# Patient Record
Sex: FEMALE | Race: WHITE | HISPANIC OR LATINO | Employment: FULL TIME | URBAN - METROPOLITAN AREA
[De-identification: names, ages, dates, MRNs, and addresses within clinical notes are randomized per-mention and may not be internally consistent; named-entity substitution may affect disease eponyms.]

---

## 2020-05-27 ENCOUNTER — HOSPITAL ENCOUNTER (OUTPATIENT)
Dept: URGENT CARE | Facility: CLINIC | Age: 85
Discharge: HOME | End: 2020-05-27
Attending: EMERGENCY MEDICINE

## 2022-05-07 PROCEDURE — 87086 URINE CULTURE/COLONY COUNT: CPT | Performed by: NURSE PRACTITIONER

## 2022-05-09 ENCOUNTER — TELEPHONE (OUTPATIENT)
Dept: URGENT CARE | Facility: CLINIC | Age: 87
End: 2022-05-09

## 2025-05-02 ENCOUNTER — APPOINTMENT (OUTPATIENT)
Dept: CT IMAGING | Facility: HOSPITAL | Age: OVER 89
End: 2025-05-02
Payer: MEDICARE

## 2025-05-02 ENCOUNTER — APPOINTMENT (OUTPATIENT)
Dept: GENERAL RADIOLOGY | Facility: HOSPITAL | Age: OVER 89
End: 2025-05-02
Payer: MEDICARE

## 2025-05-02 ENCOUNTER — HOSPITAL ENCOUNTER (OUTPATIENT)
Facility: HOSPITAL | Age: OVER 89
Setting detail: OBSERVATION
Discharge: HOME | End: 2025-05-03
Attending: EMERGENCY MEDICINE | Admitting: INTERNAL MEDICINE
Payer: MEDICARE

## 2025-05-02 DIAGNOSIS — I60.9 SAH (SUBARACHNOID HEMORRHAGE): ICD-10-CM
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
## 2025-05-02 DIAGNOSIS — R26.2 DIFFICULTY IN WALKING: ICD-10-CM

## 2025-05-02 DIAGNOSIS — S01.01XA LACERATION OF SCALP, INITIAL ENCOUNTER: ICD-10-CM

## 2025-05-02 DIAGNOSIS — R13.10 DYSPHAGIA, UNSPECIFIED TYPE: ICD-10-CM

## 2025-05-02 DIAGNOSIS — S06.6XAA SUBARACHNOID HEMATOMA, WITH UNKNOWN LOSS OF CONSCIOUSNESS STATUS, INITIAL ENCOUNTER: ICD-10-CM

## 2025-05-02 LAB
ALBUMIN SERPL-MCNC: 3.8 G/DL (ref 3.5–5.2)
ALBUMIN/GLOB SERPL: 1.4 G/DL
ALP SERPL-CCNC: 57 U/L (ref 39–117)
ALT SERPL W P-5'-P-CCNC: 10 U/L (ref 1–33)
ANION GAP SERPL CALCULATED.3IONS-SCNC: 10.2 MMOL/L (ref 5–15)
AST SERPL-CCNC: 15 U/L (ref 1–32)
BASOPHILS # BLD AUTO: 0.04 10*3/MM3 (ref 0–0.2)
BASOPHILS NFR BLD AUTO: 0.3 % (ref 0–1.5)
BILIRUB SERPL-MCNC: 0.3 MG/DL (ref 0–1.2)
BUN SERPL-MCNC: 23 MG/DL (ref 8–23)
BUN/CREAT SERPL: 19.2 (ref 7–25)
CALCIUM SPEC-SCNC: 9.1 MG/DL (ref 8.2–9.6)
CHLORIDE SERPL-SCNC: 103 MMOL/L (ref 98–107)
CO2 SERPL-SCNC: 24.8 MMOL/L (ref 22–29)
CREAT SERPL-MCNC: 1.2 MG/DL (ref 0.57–1)
DEPRECATED RDW RBC AUTO: 45.5 FL (ref 37–54)
EGFRCR SERPLBLD CKD-EPI 2021: 43.1 ML/MIN/1.73
EOSINOPHIL # BLD AUTO: 0.04 10*3/MM3 (ref 0–0.4)
EOSINOPHIL NFR BLD AUTO: 0.3 % (ref 0.3–6.2)
ERYTHROCYTE [DISTWIDTH] IN BLOOD BY AUTOMATED COUNT: 13.4 % (ref 12.3–15.4)
GLOBULIN UR ELPH-MCNC: 2.8 GM/DL
GLUCOSE BLDC GLUCOMTR-MCNC: 105 MG/DL (ref 70–99)
GLUCOSE BLDC GLUCOMTR-MCNC: 119 MG/DL (ref 70–99)
GLUCOSE SERPL-MCNC: 145 MG/DL (ref 65–99)
HCT VFR BLD AUTO: 42.7 % (ref 34–46.6)
HGB BLD-MCNC: 13.9 G/DL (ref 12–15.9)
IMM GRANULOCYTES # BLD AUTO: 0.08 10*3/MM3 (ref 0–0.05)
IMM GRANULOCYTES NFR BLD AUTO: 0.6 % (ref 0–0.5)
LYMPHOCYTES # BLD AUTO: 0.74 10*3/MM3 (ref 0.7–3.1)
LYMPHOCYTES NFR BLD AUTO: 5.9 % (ref 19.6–45.3)
MCH RBC QN AUTO: 30.2 PG (ref 26.6–33)
MCHC RBC AUTO-ENTMCNC: 32.6 G/DL (ref 31.5–35.7)
MCV RBC AUTO: 92.6 FL (ref 79–97)
MONOCYTES # BLD AUTO: 0.58 10*3/MM3 (ref 0.1–0.9)
MONOCYTES NFR BLD AUTO: 4.6 % (ref 5–12)
NEUTROPHILS NFR BLD AUTO: 11.03 10*3/MM3 (ref 1.7–7)
NEUTROPHILS NFR BLD AUTO: 88.3 % (ref 42.7–76)
NRBC BLD AUTO-RTO: 0 /100 WBC (ref 0–0.2)
PLATELET # BLD AUTO: 284 10*3/MM3 (ref 140–450)
PMV BLD AUTO: 10.9 FL (ref 6–12)
POTASSIUM SERPL-SCNC: 3.9 MMOL/L (ref 3.5–5.2)
PROT SERPL-MCNC: 6.6 G/DL (ref 6–8.5)
RBC # BLD AUTO: 4.61 10*6/MM3 (ref 3.77–5.28)
SODIUM SERPL-SCNC: 138 MMOL/L (ref 136–145)
WBC NRBC COR # BLD AUTO: 12.51 10*3/MM3 (ref 3.4–10.8)

## 2025-05-02 PROCEDURE — 73521 X-RAY EXAM HIPS BI 2 VIEWS: CPT

## 2025-05-02 PROCEDURE — 70450 CT HEAD/BRAIN W/O DYE: CPT

## 2025-05-02 PROCEDURE — G0378 HOSPITAL OBSERVATION PER HR: HCPCS

## 2025-05-02 PROCEDURE — 82948 REAGENT STRIP/BLOOD GLUCOSE: CPT

## 2025-05-02 PROCEDURE — 25010000002 CLEVIDIPINE BUTYRATE PER 1 MG: Performed by: INTERNAL MEDICINE

## 2025-05-02 PROCEDURE — 80053 COMPREHEN METABOLIC PANEL: CPT | Performed by: EMERGENCY MEDICINE

## 2025-05-02 PROCEDURE — 96366 THER/PROPH/DIAG IV INF ADDON: CPT

## 2025-05-02 PROCEDURE — 25010000002 LIDOCAINE 1% - EPINEPHRINE 1:100000 1 %-1:100000 SOLUTION: Performed by: EMERGENCY MEDICINE

## 2025-05-02 PROCEDURE — 99222 1ST HOSP IP/OBS MODERATE 55: CPT | Performed by: INTERNAL MEDICINE

## 2025-05-02 PROCEDURE — 99291 CRITICAL CARE FIRST HOUR: CPT | Performed by: INTERNAL MEDICINE

## 2025-05-02 PROCEDURE — 96367 TX/PROPH/DG ADDL SEQ IV INF: CPT

## 2025-05-02 PROCEDURE — C9248 INJ, CLEVIDIPINE BUTYRATE: HCPCS | Performed by: INTERNAL MEDICINE

## 2025-05-02 PROCEDURE — 36415 COLL VENOUS BLD VENIPUNCTURE: CPT

## 2025-05-02 PROCEDURE — 96365 THER/PROPH/DIAG IV INF INIT: CPT

## 2025-05-02 PROCEDURE — 72125 CT NECK SPINE W/O DYE: CPT

## 2025-05-02 PROCEDURE — 99285 EMERGENCY DEPT VISIT HI MDM: CPT

## 2025-05-02 PROCEDURE — 25010000002 NICARDIPINE IN NACL 20-0.86 MG/200ML-% SOLUTION: Performed by: INTERNAL MEDICINE

## 2025-05-02 PROCEDURE — 85025 COMPLETE CBC W/AUTO DIFF WBC: CPT | Performed by: EMERGENCY MEDICINE

## 2025-05-02 RX ORDER — ASPIRIN 81 MG/1
81 TABLET ORAL EVERY EVENING
COMMUNITY
End: 2025-05-03 | Stop reason: HOSPADM

## 2025-05-02 RX ORDER — AMLODIPINE BESYLATE 5 MG/1
5 TABLET ORAL DAILY
COMMUNITY
End: 2025-05-03 | Stop reason: HOSPADM

## 2025-05-02 RX ORDER — ERGOCALCIFEROL 1.25 MG/1
50000 CAPSULE, LIQUID FILLED ORAL 2 TIMES WEEKLY
COMMUNITY

## 2025-05-02 RX ORDER — SODIUM CHLORIDE 9 MG/ML
40 INJECTION, SOLUTION INTRAVENOUS AS NEEDED
Status: DISCONTINUED | OUTPATIENT
Start: 2025-05-02 | End: 2025-05-03 | Stop reason: HOSPADM

## 2025-05-02 RX ORDER — SODIUM CHLORIDE 0.9 % (FLUSH) 0.9 %
10 SYRINGE (ML) INJECTION AS NEEDED
Status: DISCONTINUED | OUTPATIENT
Start: 2025-05-02 | End: 2025-05-03 | Stop reason: HOSPADM

## 2025-05-02 RX ORDER — LIDOCAINE HYDROCHLORIDE AND EPINEPHRINE 10; 10 MG/ML; UG/ML
10 INJECTION, SOLUTION INFILTRATION; PERINEURAL ONCE
Status: COMPLETED | OUTPATIENT
Start: 2025-05-02 | End: 2025-05-02

## 2025-05-02 RX ORDER — SODIUM CHLORIDE 0.9 % (FLUSH) 0.9 %
10 SYRINGE (ML) INJECTION EVERY 12 HOURS SCHEDULED
Status: DISCONTINUED | OUTPATIENT
Start: 2025-05-02 | End: 2025-05-03 | Stop reason: HOSPADM

## 2025-05-02 RX ADMIN — LIDOCAINE HYDROCHLORIDE,EPINEPHRINE BITARTRATE 10 ML: 10; .01 INJECTION, SOLUTION INFILTRATION; PERINEURAL at 11:30

## 2025-05-02 RX ADMIN — Medication 10 ML: at 20:50

## 2025-05-02 RX ADMIN — CLEVIPIDINE 2 MG/HR: 0.5 EMULSION INTRAVENOUS at 16:30

## 2025-05-02 RX ADMIN — NICARDIPINE HYDROCHLORIDE 5 MG/HR: 0.1 INJECTION, SOLUTION INTRAVENOUS at 15:45

## 2025-05-02 NOTE — ED PROVIDER NOTES
Time: 11:13 AM EDT  Date of encounter:  5/2/2025  Independent Historian/Clinical History and Information was obtained by:   Patient and Family    History is limited by: N/A    Chief Complaint: Fall      History of Present Illness:  Patient is a 89 y.o. year old female who presents to the emergency department for evaluation of injuries status post fall.  Patient reports she tripped on a floor grate and fell hitting her head.  Unknown loss of consciousness.      Patient Care Team  Primary Care Provider: Provider, Yohana Known    Past Medical History:     Allergies   Allergen Reactions    Penicillins Hives     Past Medical History:   Diagnosis Date    Hypertension      History reviewed. No pertinent surgical history.  History reviewed. No pertinent family history.    Home Medications:  Prior to Admission medications    Medication Sig Start Date End Date Taking? Authorizing Provider   amLODIPine (NORVASC) 5 MG tablet Take 1 tablet by mouth Daily.    Lorraine Bauman MD   aspirin 81 MG EC tablet Take 1 tablet by mouth Daily.    Lorraine Bauman MD   vitamin D (ERGOCALCIFEROL) 1.25 MG (63443 UT) capsule capsule Take 1 capsule by mouth 1 (One) Time Per Week.    Lorraine Bauman MD        Social History:   Social History     Tobacco Use    Smoking status: Never    Smokeless tobacco: Never   Vaping Use    Vaping status: Never Used   Substance Use Topics    Alcohol use: Never    Drug use: Never         Review of Systems:  Review of Systems   Constitutional:  Negative for chills and fever.   HENT:  Negative for congestion, rhinorrhea and sore throat.    Eyes:  Negative for pain and visual disturbance.   Respiratory:  Negative for apnea, cough, chest tightness and shortness of breath.    Cardiovascular:  Negative for chest pain and palpitations.   Gastrointestinal:  Negative for abdominal pain, diarrhea, nausea and vomiting.   Genitourinary:  Negative for difficulty urinating and dysuria.   Musculoskeletal:  Negative  "for joint swelling and myalgias.   Skin:  Negative for color change.   Neurological:  Negative for seizures and headaches.   Psychiatric/Behavioral: Negative.     All other systems reviewed and are negative.       Physical Exam:  /67   Pulse 68   Temp 97.8 °F (36.6 °C)   Resp 18   Ht 160 cm (63\")   Wt 74.4 kg (164 lb 0.4 oz)   SpO2 96%   BMI 29.06 kg/m²     Physical Exam  Vitals and nursing note reviewed.   Constitutional:       General: She is not in acute distress.     Appearance: Normal appearance. She is not toxic-appearing.   HENT:      Head: Normocephalic.      Jaw: There is normal jaw occlusion.   Eyes:      General: Lids are normal.      Extraocular Movements: Extraocular movements intact.      Conjunctiva/sclera: Conjunctivae normal.      Pupils: Pupils are equal, round, and reactive to light.   Cardiovascular:      Rate and Rhythm: Normal rate and regular rhythm.      Pulses: Normal pulses.      Heart sounds: Normal heart sounds.   Pulmonary:      Effort: Pulmonary effort is normal. No respiratory distress.      Breath sounds: Normal breath sounds. No wheezing or rhonchi.   Abdominal:      General: Abdomen is flat.      Palpations: Abdomen is soft.      Tenderness: There is no abdominal tenderness. There is no guarding or rebound.   Musculoskeletal:         General: Normal range of motion.      Cervical back: Normal range of motion and neck supple.      Right lower leg: No edema.      Left lower leg: No edema.      Comments: Mild discomfort bilateral hips but full range of motion   Skin:     General: Skin is warm.      Comments: Scalp laceration left posterior parietal   Neurological:      Mental Status: She is alert and oriented to person, place, and time. Mental status is at baseline.   Psychiatric:         Mood and Affect: Mood normal.                    Medical Decision Making:      Comorbidities that affect care:    Hypertension    External Notes reviewed:    Previous Clinic Note: Urgent " care visit for UTI management      The following orders were placed and all results were independently analyzed by me:  Orders Placed This Encounter   Procedures    Laceration Repair    Laceration Repair    CT Head Without Contrast    CT Cervical Spine Without Contrast    XR Hips Bilateral With or Without Pelvis 2 View    Comprehensive Metabolic Panel    CBC Auto Differential    Neurosurgery (on-call MD unless specified)    Inpatient Hospitalist Consult    CBC & Differential       Medications Given in the Emergency Department:  Medications   lidocaine 1% - EPINEPHrine 1:846202 (XYLOCAINE W/EPI) 1 %-1:384942 injection 10 mL (has no administration in time range)        ED Course:         Labs:    Lab Results (last 24 hours)       Procedure Component Value Units Date/Time    CBC & Differential [[id number]]  (Abnormal) Collected: 05/02/25 1140    Specimen: Blood Updated: 05/02/25 1144    Narrative:      The following orders were created for panel order CBC & Differential.  Procedure                               Abnormality         Status                     ---------                               -----------         ------                     CBC Auto Differential[[id number]]        Abnormal            Final result                 Please view results for these tests on the individual orders.    Comprehensive Metabolic Panel [[id number]]  (Abnormal) Collected: 05/02/25 1140    Specimen: Blood Updated: 05/02/25 1210     Glucose 145 mg/dL      BUN 23 mg/dL      Creatinine 1.20 mg/dL      Sodium 138 mmol/L      Potassium 3.9 mmol/L      Chloride 103 mmol/L      CO2 24.8 mmol/L      Calcium 9.1 mg/dL      Total Protein 6.6 g/dL      Albumin 3.8 g/dL      ALT (SGPT) 10 U/L      AST (SGOT) 15 U/L      Alkaline Phosphatase 57 U/L      Total Bilirubin 0.3 mg/dL      Globulin 2.8 gm/dL      A/G Ratio 1.4 g/dL      BUN/Creatinine Ratio 19.2     Anion Gap 10.2 mmol/L      eGFR 43.1 mL/min/1.73     Narrative:      GFR Categories in  Chronic Kidney Disease (CKD)              GFR Category          GFR (mL/min/1.73)    Interpretation  G1                    90 or greater        Normal or high (1)  G2                    60-89                Mild decrease (1)  G3a                   45-59                Mild to moderate decrease  G3b                   30-44                Moderate to severe decrease  G4                    15-29                Severe decrease  G5                    14 or less           Kidney failure    (1)In the absence of evidence of kidney disease, neither GFR category G1 or G2 fulfill the criteria for CKD.    eGFR calculation 2021 CKD-EPI creatinine equation, which does not include race as a factor    CBC Auto Differential [[id number]]  (Abnormal) Collected: 05/02/25 1140    Specimen: Blood Updated: 05/02/25 1144     WBC 12.51 10*3/mm3      RBC 4.61 10*6/mm3      Hemoglobin 13.9 g/dL      Hematocrit 42.7 %      MCV 92.6 fL      MCH 30.2 pg      MCHC 32.6 g/dL      RDW 13.4 %      RDW-SD 45.5 fl      MPV 10.9 fL      Platelets 284 10*3/mm3      Neutrophil % 88.3 %      Lymphocyte % 5.9 %      Monocyte % 4.6 %      Eosinophil % 0.3 %      Basophil % 0.3 %      Immature Grans % 0.6 %      Neutrophils, Absolute 11.03 10*3/mm3      Lymphocytes, Absolute 0.74 10*3/mm3      Monocytes, Absolute 0.58 10*3/mm3      Eosinophils, Absolute 0.04 10*3/mm3      Basophils, Absolute 0.04 10*3/mm3      Immature Grans, Absolute 0.08 10*3/mm3      nRBC 0.0 /100 WBC              Imaging:    XR Hips Bilateral With or Without Pelvis 2 View  Result Date: 5/2/2025  XR HIPS BILATERAL W OR WO PELVIS 2 VIEW Date of Exam: 5/2/2025 11:20 AM EDT Indication: fall, pain Comparison: None available. Findings: Subtle findings are limited by diffuse osteopenia. The bony pelvis appears to be grossly intact without evidence of fracture or diastases. Evaluation of the sacrum and coccyx additionally limited by bowel gas and fecal material. Degenerative changes are noted at  the lumbar sacral spine most prominently to the right at L4-L5. Bilateral hips demonstrate no displaced fracture or dislocation.     Impression: No displaced fracture or dislocation within the limitations of the study. If there is high clinical suspicion and additional imaging can always be obtained Electronically Signed: Oliver To MD  5/2/2025 11:45 AM EDT  Workstation ID: [id number]    CT Cervical Spine Without Contrast  Result Date: 5/2/2025  CT CERVICAL SPINE WO CONTRAST Date of Exam: 5/2/2025 10:32 AM EDT Indication: Fall with head injury. Comparison: None available. Technique: Axial CT images were obtained of the cervical spine without contrast administration.  Reconstructed coronal and sagittal images were also obtained. Automated exposure control and iterative construction methods were used. Findings: OSSEOUS STRUCTURES: No fracture noted. Along the left transverse process of the T1 vertebral body there is lucency and mild osseous expansion with underlying osseous lesion not excluded. ALIGNMENT: There is grossly normal alignment of cervical vertebral bodies. DISC SPACE: Intervertebral disc base narrowing is noted at levels C5-C7. JOINTS: There is multilevel facet hypertrophy. Degenerative changes are noted at the right temporomandibular joint with flattening of the mandibular condyle. SOFT TISSUES: There is a right thyroid nodule measuring 7 mm without follow-up necessary. LUNG APICES: Unremarkable LEVELS: C2/3: Broad-based disc osteophyte complex with uncovertebral joint spurring and facet changes without significant central canal or neural foramina stenosis. C3/4: Broad-based disc osteophyte complex with facet hypertrophy with uncovertebral joint spurring with mild to moderate bilateral neural foramina stenosis. C4/5: Broad-based disc osteophyte complex with bilateral facet hypertrophy. There is moderate to severe bilateral neural foramina stenosis. C5/6: Broad-based disc osteophyte complex with  bilateral facet hypertrophy with severe left neural foramina stenosis. C6/7: Broad-based disc osteophyte complex with facet changes and uncovertebral joint spurring with moderate left neural foramina stenosis.     Impression: 1. Multilevel degenerative changes in the cervical spine without acute osseous injury. 2. Lucency within the left T1 transverse process with mild osseous expansion for which underlying osseous lesion and/or metastatic disease cannot be excluded. Electronically Signed: Shelby Conway MD  5/2/2025 11:03 AM EDT  Workstation ID: [id number]    CT Head Without Contrast  Result Date: 5/2/2025  CT HEAD WO CONTRAST Date of Exam: 5/2/2025 10:32 AM EDT Indication: Fall with head injury. Comparison: None available. Technique: Axial CT images were obtained of the head and cervical spine without contrast administration.  Reconstructed coronal and sagittal images were also obtained. Automated exposure control and iterative construction methods were used. Findings: CT HEAD: There is no evidence of acute infarction. There is a small area of subarachnoid hemorrhage involving a high posterior right frontoparietal lobe sulcus (images 37-40, series 3). No definitive additional hemorrhage identified. There are no extra-axial collections. Ventricles and CSF spaces are symmetric. No mass effect nor hydrocephalus. Brain parenchyma appears normal for age.  Paranasal sinuses and mastoid air cells are adequately aerated.  Osseous structures and orbits appear intact. There is a left parietal scalp laceration. No radiopaque foreign body identified. CT CERVICAL SPINE: OSSEOUS STRUCTURES: No fracture nor bony destructive process. There are large flowing osteophytes along the left anterior margin of the vertebral bodies spanning C5-C7. ALIGNMENT: There is 2 mm of anterior listhesis of C4 on C5 and C5 on C6. Alignment is otherwise normal. DISC SPACE: There is moderate to advanced lower cervical spondylosis most pronounced at  C6/7. JOINTS: There is advanced facet arthropathy most pronounced in the mid cervical spine. SOFT TISSUES: There is calcified carotid atherosclerotic disease. LUNG APICES: Unremarkable LEVELS: No significant osseous central canal nor foraminal stenosis identified at any level.     Impression: 1.Small area of subarachnoid hemorrhage involving a high posterior right frontoparietal lobe sulcus. 2.Left parietal scalp laceration. No radiopaque foreign body identified. 3.No cervical spine fracture. 4.Cervical degenerative changes as described above. Critical findings discussed by Dr. Sukumar Conway with Ibrahima Rao MD at 10:56 a.m. on 5/2/2025 Electronically Signed: Sukumar Conway MD  5/2/2025 10:58 AM EDT  Workstation ID: [id number]        Differential Diagnosis and Discussion:    Trauma:  Differential diagnosis considered but not limited to were subarachnoid hemorrhage, intracranial bleeding, pneumothorax, cardiac contusion, lung contusion, intra-abdominal bleeding, and compartment syndrome of any extremity or other significant traumatic pathology    PROCEDURES:    Labs were collected in the emergency department and all labs were reviewed and interpreted by me.  X-ray were performed in the emergency department and all X-ray impressions were independently interpreted by me.  CT scan was performed in the emergency department and the CT scan radiology impression was interpreted by me.    No orders to display       Laceration Repair    Date/Time: 5/2/2025 12:34 PM    Performed by: Lani Gonzalez APRN  Authorized by: Shreyas Warren MD    Consent:     Consent obtained:  Verbal    Consent given by:  Patient    Risks, benefits, and alternatives were discussed: yes      Risks discussed:  Infection, pain, retained foreign body, tendon damage, poor cosmetic result, need for additional repair, nerve damage, poor wound healing and vascular damage    Alternatives discussed:  No treatment  Anesthesia:     Anesthesia method:  Local  infiltration    Local anesthetic:  Lidocaine 1% WITH epi  Laceration details:     Location:  Scalp    Scalp location:  L temporal    Length (cm):  3  Exploration:     Limited defect created (wound extended): no      Hemostasis achieved with:  Epinephrine    Imaging outcome: foreign body not noted      Wound exploration: entire depth of wound visualized      Contaminated: no    Treatment:     Area cleansed with:  Chlorhexidine and saline    Amount of cleaning:  Standard    Irrigation solution:  Sterile saline    Irrigation volume:  50    Irrigation method:  Syringe    Visualized foreign bodies/material removed: no      Debridement:  None    Undermining:  None    Scar revision: no    Skin repair:     Repair method:  Staples    Number of staples:  4  Approximation:     Approximation:  Close  Repair type:     Repair type:  Simple  Post-procedure details:     Dressing:  Open (no dressing)    Procedure completion:  Tolerated well, no immediate complications  Laceration Repair    Date/Time: 5/2/2025 12:39 PM    Performed by: Lani Gonzalez APRN  Authorized by: Shreyas Warren MD    Consent:     Consent obtained:  Verbal    Consent given by:  Patient    Risks, benefits, and alternatives were discussed: yes      Risks discussed:  Infection, pain, tendon damage, retained foreign body, vascular damage, poor wound healing, poor cosmetic result, need for additional repair and nerve damage    Alternatives discussed:  No treatment  Anesthesia:     Anesthesia method:  Local infiltration    Local anesthetic:  Lidocaine 1% WITH epi  Laceration details:     Location:  Scalp    Scalp location:  L temporal    Length (cm):  2  Pre-procedure details:     Preparation:  Patient was prepped and draped in usual sterile fashion and imaging obtained to evaluate for foreign bodies  Exploration:     Limited defect created (wound extended): no      Hemostasis achieved with:  Epinephrine    Imaging outcome: foreign body not noted      Wound  exploration: entire depth of wound visualized      Wound extent: no areolar tissue violation noted, no fascia violation noted, no foreign bodies/material noted, no muscle damage noted, no nerve damage noted, no tendon damage noted, no underlying fracture noted and no vascular damage noted      Contaminated: no    Treatment:     Area cleansed with:  Chlorhexidine and saline    Amount of cleaning:  Standard    Irrigation solution:  Sterile saline    Irrigation volume:  50    Irrigation method:  Syringe    Visualized foreign bodies/material removed: no      Debridement:  None    Undermining:  None    Scar revision: no    Skin repair:     Repair method:  Staples    Number of staples:  2  Approximation:     Approximation:  Close  Repair type:     Repair type:  Simple  Post-procedure details:     Dressing:  Open (no dressing)    Procedure completion:  Tolerated well, no immediate complications      MDM  Number of Diagnoses or Management Options  Fall, initial encounter  Laceration of scalp, initial encounter  Subarachnoid hematoma, with unknown loss of consciousness status, initial encounter  Diagnosis management comments: In summary this is a 89-year-old female who presents to the Emergency Department for evaluation after trip and fall at home.  CT brain reviewed by me does show small subdural hemorrhage for which neurosurgery was consulted and recommends repeat CT scan in the morning to ensure stability.  CBC independently reviewed and interpreted by me and shows no critical abnormalities.  CMP independently reviewed and interpreted by me and shows no critical abnormalities.  X-ray of bilateral hips is unremarkable negative for acute traumatic injuries.  Patient case has been discussed with the hospitalist team who will admit to the hospital for further evaluation and continuation of treatment.             Total Critical Care time of 35 minutes. Total critical care time documented does not include time spent on  separately billed procedures for services of nurses or physician assistants including emergently consulting neurosurgery, monitoring blood pressure with a low threshold to start hypertensive medications.. I personally saw and examined the patient. I have reviewed all diagnostic interpretations and treatment plans as written. I was present for the key portions of any procedures performed and the inclusive time noted in any critical care statement. Critical care time includes patient management by me, time spent at the patients bedside,  time to review lab and imaging results, discussing patient care, documentation in the medical record, and time spent with family or caregiver.          Patient Care Considerations:    CHEST X-RAY: I considered ordering a chest x-ray however no respiratory complaints      Consultants/Shared Management Plan:    Hospitalist: I have discussed the case with Dr. Gilbert who agrees to accept the patient for admission.  Consultant: I have discussed the case with Dr. López who agrees to consult on the patient.    Social Determinants of Health:    Patient has presented with family members who are responsible, reliable and will ensure follow up care.      Disposition and Care Coordination:    Admit:   Through independent evaluation of the patient's history, physical, and imperical data, the patient meets criteria for inpatient admission to the hospital.        Final diagnoses:   Fall, initial encounter   Subarachnoid hematoma, with unknown loss of consciousness status, initial encounter   Laceration of scalp, initial encounter        ED Disposition       ED Disposition   Decision to Admit    Condition   --    Comment   --               This medical record created using voice recognition software.             Shreyas Warren MD  05/02/25 [id number]

## 2025-05-02 NOTE — H&P
[facility]  Date: 2025   Patient Name: Lindsey Choi  : 1934  MRN: [id number]  Primary Care Physician:  Provider, No Known  Date of admission: 2025    Subjective   Subjective     Chief Complaint: Fall    HPI:    Lindsey Choi is a 89 y.o. female past medical history of hypertension who presents emergency department after a fall    Patient reported that she had a fall from standing today.  Notes that she had a cut on her head.  As result scan the ER for further evaluation.    In the emergency department patient's vital signs are as follows: Temperature 97.8, pulse 68, respirate is 18, blood pressure 1 3467, 96% on room air.  CBC shows a white count of 12.51.  CMP shows a creatinine 1.20 and a glucose of 145.  CT of the head shows small area of subarachnoid hemorrhage involving high posterior right frontal parietal lobe sulcus.  Left parietal scalp laceration.  No cervical spine fracture.  CT of the cervical spine showed lucency within the left T1 transverse process with mild osseous expansion with underlying osseous lesion and/or metastatic disease cannot be excluded?.  Neurosurgery was consulted.  It was discussed with them they felt the patient be admitted to the hospital watched overnight repeat CT in the morning.  If patient has no expansion and is doing relatively well should be able to be discharged home tomorrow.  Patient admitted to hospital for subarachnoid hemorrhage.    Personal History     Past Medical History:  Hypertension    Past Surgical History:  No surgical history    Family History:   No known family history    Social History:   No smoking.  No alcohol.    Home Medications:  amLODIPine, aspirin, and vitamin D    Allergies:  Allergies   Allergen Reactions    Penicillins Hives         Objective   Objective     Vitals:   Temp:  [97.8 °F (36.6 °C)] 97.8 °F (36.6 °C)  Heart Rate:  [66-71] 68  Resp:  [18] 18  BP: (115-134)/(52-71)  134/67    Physical Exam    Constitutional: Awake, alert, no acute distress   Eyes: Pupils equal, sclerae anicteric, no conjunctival injection.  Scalp lesion   HENT: NCAT, mucous membranes moist   Neck: Supple, no thyromegaly, no lymphadenopathy, trachea midline   Respiratory: Clear to auscultation bilaterally, nonlabored respirations    Cardiovascular: RRR, no murmurs, rubs, or gallops, palpable pedal pulses bilaterally   Gastrointestinal: Positive bowel sounds, soft, nontender, nondistended   Musculoskeletal: No bilateral ankle edema, no clubbing or cyanosis to extremities   Psychiatric: Appropriate affect, cooperative   Neurologic: Oriented x 3, strength symmetric in all extremities, Cranial Nerves grossly intact to confrontation, speech clear   Skin: No rashes     Result Review    Result Review:  I have personally reviewed the results from the time of this admission to 5/2/2025 14:24 EDT and agree with these findings:  Imaging and labs reviewed    Assessment & Plan   Assessment / Plan     Assessment/Plan:   Subarachnoid hemorrhage  Scalp laceration  Lucency on left T1 transverse process osseous lesion versus metastatic disease?    Plan:  --Admit to hospital service  -- Consulted pulmonary/critical care.  Discussed the plan with them and patient will be admitted to ICU for further management  -- Consulted neurosurgery  -- Repeat CT of the head in the morning  -- Neuro checks  -- Blood pressure control with systolics below 140 and there is some medicine written in case her systolic climbs        VTE Prophylaxis:  No anticoagulation due to bleed        CODE STATUS:     Full code    Admission Status:  I believe this patient meets observation status.    Electronically signed by Mark Rothman MD, 05/02/25, 2:24 PM EDT.

## 2025-05-02 NOTE — CONSULTS
Pulmonary / Critical Care Consult Note      Patient Name: Lindsey Choi  : 1934  MRN: [id number]  Primary Care Physician:  Provider, No Known  Referring Physician: Mark Rothman MD  Date of admission: 2025    Subjective   Subjective     Reason for Consult/ Chief Complaint:   Subarachnoid hemorrhage    HPI:  Lindsey Choi is a 89 y.o. female history of hypertension came in after a fall.  Had a mechanical fall at home.  Had a cut on her head and received laceration repair for that.  As part of this workup she had a CT done showing a small subarachnoid hemorrhage.  Neurosurgery was consulted.  Monitor for now.  Blood pressure was elevated.  Grade 150s and has been started on a Cardene drip.  She is resting in bed in the ICU.  Does have some pain over her laceration site.          Personal History     Past Medical History:   Diagnosis Date    Hypertension        History reviewed. No pertinent surgical history.    Family History: family history is not on file. Otherwise pertinent FHx was reviewed and not pertinent to current issue.    Social History:  reports that she has never smoked. She has never used smokeless tobacco. She reports that she does not drink alcohol and does not use drugs.    Home Medications:  amLODIPine, aspirin, and vitamin D    Allergies:  Allergies   Allergen Reactions    Penicillins Hives       Objective    Objective     Vitals:   Temp:  [97.8 °F (36.6 °C)] 97.8 °F (36.6 °C)  Heart Rate:  [66-88] 88  Resp:  [18] 18  BP: (115-151)/(52-71) 151/65    Physical Exam:  Vital Signs Reviewed   General:  WDWN, Alert, NAD.    HEENT: Left scalp laceration, PERRL, EOMI.  OP, nares clear  Neck:  Supple, no JVD, no thyromegaly  Chest:  good aeration, clear to auscultation bilaterally, tympanic to percussion bilaterally, no work of breathing noted  CV: RRR, no MGR, pulses 2+, equal.  Abd:  Soft, NT, ND, + BS, no HSM  EXT:  no clubbing, no cyanosis, no edema  Neuro:  A&Ox3, CN grossly  intact, no focal deficits.  Skin: No rashes or lesions noted      Result Review    Result Review:  I have personally reviewed the results from the time of this admission to 5/2/2025 17:24 EDT and agree with these findings:  [x]  Laboratory  []  Microbiology  [x]  Radiology  [x]  EKG/Telemetry   []  Cardiology/Vascular   []  Pathology  []  Old records  []  Other:  Most notable findings include:       Lab 05/02/25  1140   WBC 12.51*   HEMOGLOBIN 13.9   HEMATOCRIT 42.7   PLATELETS 284   SODIUM 138   POTASSIUM 3.9   CHLORIDE 103   CO2 24.8   BUN 23   CREATININE 1.20*   GLUCOSE 145*   CALCIUM 9.1   TOTAL PROTEIN 6.6   ALBUMIN 3.8   GLOBULIN 2.8     CT Head Without Contrast  Result Date: 5/2/2025  CT HEAD WO CONTRAST Date of Exam: 5/2/2025 10:32 AM EDT Indication: Fall with head injury. Comparison: None available. Technique: Axial CT images were obtained of the head and cervical spine without contrast administration.  Reconstructed coronal and sagittal images were also obtained. Automated exposure control and iterative construction methods were used. Findings: CT HEAD: There is no evidence of acute infarction. There is a small area of subarachnoid hemorrhage involving a high posterior right frontoparietal lobe sulcus (images 37-40, series 3). No definitive additional hemorrhage identified. There are no extra-axial collections. Ventricles and CSF spaces are symmetric. No mass effect nor hydrocephalus. Brain parenchyma appears normal for age.  Paranasal sinuses and mastoid air cells are adequately aerated.  Osseous structures and orbits appear intact. There is a left parietal scalp laceration. No radiopaque foreign body identified. CT CERVICAL SPINE: OSSEOUS STRUCTURES: No fracture nor bony destructive process. There are large flowing osteophytes along the left anterior margin of the vertebral bodies spanning C5-C7. ALIGNMENT: There is 2 mm of anterior listhesis of C4 on C5 and C5 on C6. Alignment is otherwise normal. DISC  SPACE: There is moderate to advanced lower cervical spondylosis most pronounced at C6/7. JOINTS: There is advanced facet arthropathy most pronounced in the mid cervical spine. SOFT TISSUES: There is calcified carotid atherosclerotic disease. LUNG APICES: Unremarkable LEVELS: No significant osseous central canal nor foraminal stenosis identified at any level.     Impression: Impression: 1.Small area of subarachnoid hemorrhage involving a high posterior right frontoparietal lobe sulcus. 2.Left parietal scalp laceration. No radiopaque foreign body identified. 3.No cervical spine fracture. 4.Cervical degenerative changes as described above. Critical findings discussed by Dr. Sukumar Conway with Ibrahima Rao MD at 10:56 a.m. on 5/2/2025 Electronically Signed: Sukumar Conway MD  5/2/2025 10:58 AM EDT  Workstation ID: [id number]        Assessment & Plan   Assessment / Plan     Active Hospital Problems:  Active Hospital Problems    Diagnosis     **SAH (subarachnoid hemorrhage)      Impression:  Traumatic subarachnoid hemorrhage  Accelerated hypertension  Scalp laceration  Lucency on T1 transverse process, osseous lesion versus metastatic disease    Plan:  Admit to ICU  Change Cardene to Cleviprex drip.  Goal systolic blood pressure less than 140/90  Repeat head CT in 6 to 8 hours  Monitor neurological status closely  Advance diet as tolerated  Continue current pain regimen  Trend renal panel and electrolytes  Appreciate neurosurgery input    VTE Prophylaxis:  Mechanical VTE prophylaxis orders are present.    There are no questions and answers to display.        The patient is critically ill in the ICU with subarachnoid hemorrhage, hypertension on Cleviprex drip. Multidisciplinary bedside critical care rounds were performed with nursing staff, respiratory therapy, pharmacy, nutritional services, social work. I have personally reviewed the chart, labs and any pertinent imaging available.  I have spent 32 minutes of critical care  time, excluding procedures, in the care of this patient.    Electronically signed by Mele Ramos MD, 05/02/25, 5:24 PM EDT.

## 2025-05-03 ENCOUNTER — READMISSION MANAGEMENT (OUTPATIENT)
Dept: CALL CENTER | Facility: HOSPITAL | Age: OVER 89
End: 2025-05-03
Payer: MEDICARE

## 2025-05-03 ENCOUNTER — APPOINTMENT (OUTPATIENT)
Dept: CT IMAGING | Facility: HOSPITAL | Age: OVER 89
End: 2025-05-03
Payer: MEDICARE

## 2025-05-03 VITALS
SYSTOLIC BLOOD PRESSURE: 133 MMHG | TEMPERATURE: 98.1 F | OXYGEN SATURATION: 95 % | HEIGHT: 63 IN | HEART RATE: 84 BPM | BODY MASS INDEX: 29.06 KG/M2 | DIASTOLIC BLOOD PRESSURE: 68 MMHG | RESPIRATION RATE: 14 BRPM | WEIGHT: 164.02 LBS

## 2025-05-03 LAB
ALBUMIN SERPL-MCNC: 3.8 G/DL (ref 3.5–5.2)
ALBUMIN/GLOB SERPL: 1.4 G/DL
ALP SERPL-CCNC: 62 U/L (ref 39–117)
ALT SERPL W P-5'-P-CCNC: 10 U/L (ref 1–33)
ANION GAP SERPL CALCULATED.3IONS-SCNC: 14.4 MMOL/L (ref 5–15)
AST SERPL-CCNC: 14 U/L (ref 1–32)
BILIRUB SERPL-MCNC: 0.4 MG/DL (ref 0–1.2)
BUN SERPL-MCNC: 17 MG/DL (ref 8–23)
BUN/CREAT SERPL: 18.3 (ref 7–25)
CALCIUM SPEC-SCNC: 8.9 MG/DL (ref 8.2–9.6)
CHLORIDE SERPL-SCNC: 103 MMOL/L (ref 98–107)
CHOLEST SERPL-MCNC: 196 MG/DL (ref 0–200)
CO2 SERPL-SCNC: 21.6 MMOL/L (ref 22–29)
CREAT SERPL-MCNC: 0.93 MG/DL (ref 0.57–1)
DEPRECATED RDW RBC AUTO: 45.1 FL (ref 37–54)
EGFRCR SERPLBLD CKD-EPI 2021: 58.5 ML/MIN/1.73
ERYTHROCYTE [DISTWIDTH] IN BLOOD BY AUTOMATED COUNT: 13.3 % (ref 12.3–15.4)
GLOBULIN UR ELPH-MCNC: 2.7 GM/DL
GLUCOSE SERPL-MCNC: 128 MG/DL (ref 65–99)
HBA1C MFR BLD: 5.6 % (ref 4.8–5.6)
HCT VFR BLD AUTO: 44.8 % (ref 34–46.6)
HDLC SERPL-MCNC: 48 MG/DL (ref 40–60)
HGB BLD-MCNC: 14.4 G/DL (ref 12–15.9)
LDLC SERPL CALC-MCNC: 125 MG/DL (ref 0–100)
LDLC/HDLC SERPL: 2.55 {RATIO}
MAGNESIUM SERPL-MCNC: 2.2 MG/DL (ref 1.6–2.4)
MCH RBC QN AUTO: 29.6 PG (ref 26.6–33)
MCHC RBC AUTO-ENTMCNC: 32.1 G/DL (ref 31.5–35.7)
MCV RBC AUTO: 92 FL (ref 79–97)
PHOSPHATE SERPL-MCNC: 2.7 MG/DL (ref 2.5–4.5)
PLATELET # BLD AUTO: 293 10*3/MM3 (ref 140–450)
PMV BLD AUTO: 10.9 FL (ref 6–12)
POTASSIUM SERPL-SCNC: 3.8 MMOL/L (ref 3.5–5.2)
PROT SERPL-MCNC: 6.5 G/DL (ref 6–8.5)
RBC # BLD AUTO: 4.87 10*6/MM3 (ref 3.77–5.28)
SODIUM SERPL-SCNC: 139 MMOL/L (ref 136–145)
TRIGL SERPL-MCNC: 128 MG/DL (ref 0–150)
VLDLC SERPL-MCNC: 23 MG/DL (ref 5–40)
WBC NRBC COR # BLD AUTO: 11.84 10*3/MM3 (ref 3.4–10.8)

## 2025-05-03 PROCEDURE — C9248 INJ, CLEVIDIPINE BUTYRATE: HCPCS | Performed by: INTERNAL MEDICINE

## 2025-05-03 PROCEDURE — G0378 HOSPITAL OBSERVATION PER HR: HCPCS

## 2025-05-03 PROCEDURE — 83735 ASSAY OF MAGNESIUM: CPT | Performed by: PHYSICIAN ASSISTANT

## 2025-05-03 PROCEDURE — 25010000002 CLEVIDIPINE BUTYRATE PER 1 MG: Performed by: INTERNAL MEDICINE

## 2025-05-03 PROCEDURE — 80061 LIPID PANEL: CPT | Performed by: INTERNAL MEDICINE

## 2025-05-03 PROCEDURE — 85027 COMPLETE CBC AUTOMATED: CPT | Performed by: PHYSICIAN ASSISTANT

## 2025-05-03 PROCEDURE — 96366 THER/PROPH/DIAG IV INF ADDON: CPT

## 2025-05-03 PROCEDURE — 99239 HOSP IP/OBS DSCHRG MGMT >30: CPT | Performed by: INTERNAL MEDICINE

## 2025-05-03 PROCEDURE — 70450 CT HEAD/BRAIN W/O DYE: CPT

## 2025-05-03 PROCEDURE — 92610 EVALUATE SWALLOWING FUNCTION: CPT

## 2025-05-03 PROCEDURE — 80053 COMPREHEN METABOLIC PANEL: CPT | Performed by: PHYSICIAN ASSISTANT

## 2025-05-03 PROCEDURE — 83036 HEMOGLOBIN GLYCOSYLATED A1C: CPT | Performed by: INTERNAL MEDICINE

## 2025-05-03 PROCEDURE — 36415 COLL VENOUS BLD VENIPUNCTURE: CPT | Performed by: INTERNAL MEDICINE

## 2025-05-03 PROCEDURE — 97161 PT EVAL LOW COMPLEX 20 MIN: CPT | Performed by: PHYSICAL THERAPIST

## 2025-05-03 PROCEDURE — 99291 CRITICAL CARE FIRST HOUR: CPT | Performed by: INTERNAL MEDICINE

## 2025-05-03 PROCEDURE — 84100 ASSAY OF PHOSPHORUS: CPT | Performed by: PHYSICIAN ASSISTANT

## 2025-05-03 PROCEDURE — 99203 OFFICE O/P NEW LOW 30 MIN: CPT | Performed by: NEUROLOGICAL SURGERY

## 2025-05-03 RX ORDER — AMLODIPINE BESYLATE 5 MG/1
10 TABLET ORAL DAILY
Status: DISCONTINUED | OUTPATIENT
Start: 2025-05-03 | End: 2025-05-03 | Stop reason: HOSPADM

## 2025-05-03 RX ORDER — AMLODIPINE BESYLATE 10 MG/1
10 TABLET ORAL DAILY
Qty: 30 TABLET | Refills: 0 | Status: SHIPPED | OUTPATIENT
Start: 2025-05-04 | End: 2025-06-03

## 2025-05-03 RX ORDER — POTASSIUM CHLORIDE 750 MG/1
20 CAPSULE, EXTENDED RELEASE ORAL ONCE
Status: COMPLETED | OUTPATIENT
Start: 2025-05-03 | End: 2025-05-03

## 2025-05-03 RX ADMIN — AMLODIPINE BESYLATE 10 MG: 5 TABLET ORAL at 08:26

## 2025-05-03 RX ADMIN — CLEVIPIDINE 3 MG/HR: 0.5 EMULSION INTRAVENOUS at 05:43

## 2025-05-03 RX ADMIN — POTASSIUM CHLORIDE 20 MEQ: 750 CAPSULE, EXTENDED RELEASE ORAL at 08:26

## 2025-05-03 NOTE — CONSULTS
[facility]   Neurosurgery Consult    Patient Name:Lindsey Choi  : 1934  MRN: [id number]  Primary Care Physician: Provider, No Known  Date of admission: 2025    Subjective   Subjective     Chief Complaint: Fall with small amount of traumatic subarachnoid hemorrhage.    Fall  Pertinent negatives include no headaches or numbness.   Head Laceration  Pertinent negative symptoms include no headaches, no numbness and no weakness.      Lindsey Choi is a 89 y.o. female who tripped on a heating grate in her home.  She did not lose consciousness.  She did have a laceration.  She was brought to the emergency department and the laceration on her scalp was repaired.  She had a CT scan was demonstrated a very small amount of traumatic subarachnoid hemorrhage.  She was admitted to the ICU for observation.  The repeat CT scan showed no change.  She denies any headache.  She denies any new numbness or weakness.  She did have a little bit of dizziness after the fall but this has improved.    Review of Systems   Neurological:  Negative for weakness, numbness and headaches.         Personal History     Past Medical History:   Diagnosis Date    Hypertension        History reviewed. No pertinent surgical history.    Family History: Her family history is not on file.     Social History: She  reports that she has never smoked. She has never used smokeless tobacco. She reports that she does not drink alcohol and does not use drugs.    Home Medications:  amLODIPine, aspirin, and vitamin D    Allergies:  She is allergic to penicillins.    Objective    Objective     Vitals:    Temp:  [97.7 °F (36.5 °C)-98.2 °F (36.8 °C)] 98.2 °F (36.8 °C)  Heart Rate:  [] 74  Resp:  [12-21] 14  BP: ()/(48-90) 108/62    Physical Exam  Neurological:      Mental Status: She is alert and oriented to person, place, and time.      Motor: No weakness.   Psychiatric:         Mood and Affect: Mood normal.          Result  Review    Result Review:  I have personally reviewed the results from the time of this admission to 5/3/2025 07:53 EDT and agree with these findings:  []  Laboratory  []  Microbiology  [x]  Radiology  []  EKG/Telemetry   []  Cardiology/Vascular   []  Pathology  []  Old records  []  Other:  Most notable findings include: Small amount of right posterior frontal subarachnoid hemorrhage unchanged on follow-up.    Assessment & Plan   Assessment / Plan     Brief Patient Summary:  Lindsey Choi is a 89 y.o. female who has a small amount of traumatic subarachnoid hemorrhage.  Stable on follow-up scan.    Active Hospital Problems:  Active Hospital Problems    Diagnosis     **SAH (subarachnoid hemorrhage)      Plan:   From my standpoint she can be transferred from the ICU and discharge to home once she is medically stable.  We discussed that she if she has any new symptoms such as numbness weakness or tingling she would need a repeat head CT scan.  Otherwise no neurosurgical follow-up will be needed.    VTE Prophylaxis:  Mechanical VTE prophylaxis orders are present.

## 2025-05-03 NOTE — DISCHARGE INSTRUCTIONS
Please call Monday to schedule 1 month follow up with neurosurgery, Dr. López ; [phone]    Please call Monday to schedule a follow up appointment with Primary Care Physician in 3-5 days to have staples removed.

## 2025-05-03 NOTE — PLAN OF CARE
Goal Outcome Evaluation:  Plan of Care Reviewed With: patient           Outcome Evaluation: Pt presents with decreased strength, transfers and functional mobility. Will benefit from inpatient PT services and continued PT services upon discharge.    Anticipated Discharge Disposition (PT): home with home health, home with assist

## 2025-05-03 NOTE — PLAN OF CARE
Goal Outcome Evaluation:  Plan of Care Reviewed With: patient        Progress: improving  Outcome Evaluation: Patient has been sleeping on and off throughout the night. Cleviprex infusing at 3mg/hr and able to maintain SBP below 140s.Patient alert and oriented but can be forgetful. NIH 0. Has been experiencing some urinary frequency and urgency. Patient currently resting with no signs of distress. Will continue with plan of care.

## 2025-05-03 NOTE — PROGRESS NOTES
Pulmonary / Critical Care Progress Note      Patient Name: Lindsey Choi  : 1934  MRN: [id number]  Attending:  Darrell Mcclelland MD   Date of admission: 2025    Subjective   Subjective   Patient critically ill with subarachnoid hemorrhage    Over past 24 hours:  Admitted to ICU  Cleviprex ordered if needed to maintain SBP less than 140  Repeat CT with no interval change in small volume subarachnoid hemorrhage    This a.m.  No headache reported  NIH 0  Continues on Cleviprex      Objective   Objective     Vitals:   Vital signs for last 24 hours:  Temp:  [97.7 °F (36.5 °C)-98.2 °F (36.8 °C)] 98 °F (36.7 °C)  Heart Rate:  [] 74  Resp:  [12-21] 14  BP: ()/(48-90) 108/62    Intake/Output last 3 shifts:  I/O last 3 completed shifts:  In: -   Out: 900 [Urine:900]  Intake/Output this shift:  No intake/output data recorded.    Vent settings for last 24 hours:       Hemodynamic parameters for last 24 hours:       Physical Exam   Vital Signs Reviewed   General:  WDWN, Alert, NAD.    HEENT:  PERRL, EOMI.  OP, nares clear  Chest:  good aeration, clear to auscultation bilaterally, no work of breathing noted  CV: RRR, no MGR, pulses 2+, equal.  Abd:  Soft, NT, ND, + BS, no HSM  EXT:  no clubbing, no cyanosis, no edema  Neuro:  A&Ox3, CN grossly intact, no focal deficits.  Skin: No rashes or lesions noted        Result Review    Result Review:  I have personally reviewed the results from the time of this admission to 5/3/2025 08:52 EDT and agree with these findings:  [x]  Laboratory  []  Microbiology  [x]  Radiology  [x]  EKG/Telemetry   []  Cardiology/Vascular   []  Pathology  []  Old records  []  Other:  Most notable findings include:       Lab 25  0218 25  1140   WBC 11.84* 12.51*   HEMOGLOBIN 14.4 13.9   HEMATOCRIT 44.8 42.7   PLATELETS 293 284   SODIUM 139 138   POTASSIUM 3.8 3.9   CHLORIDE 103 103   CO2 21.6* 24.8   BUN 17 23   CREATININE 0.93 1.20*   GLUCOSE 128* 145*   CALCIUM  8.9 9.1   PHOSPHORUS 2.7  --    TOTAL PROTEIN 6.5 6.6   ALBUMIN 3.8 3.8   GLOBULIN 2.7 2.8     CT Head Without Contrast  Result Date: 5/3/2025  CT HEAD WO CONTRAST-  Date of exam: 5/3/2025 2:29 AM.  Comparison: 5/2/2025.  INDICATIONS: 89-year-old female with history of acute SAH (subarachnoid hemorrhage); w19.xxxa-unspecified fall, initial encounter; s06.6xaa-traumatic subarachnoid hemorrhage with loss of consciousness status unknown, initial encounter; s01.01xa-laceration without foreign body of scalp, initial encounter.  TECHNIQUE: Axial CT images were obtained of the head without contrast administration. Reconstructed 2D coronal and sagittal images were also obtained. Automated exposure control and iterative construction methods were used. Additionally, the radiation dose reduction device was turned on for each scan per the ALARA (As Low as Reasonably Achievable) protocol. Please see the electronic medical record, EMR (i.e., Epic), for the documented radiation dose.  FINDINGS: A routine nonenhanced head CT was performed. Again acute subarachnoid hemorrhage is seen in the right superior frontal sulcus, as on image 44 of series 2, image 42 of series 5, image 38 of series 4, and adjacent images. Minimal, if any, acute intracranial hemorrhage is seen elsewhere. No definite new acute intracranial hemorrhage is identified. No midline shift or acute intracranial herniation syndrome. No acute infarct is suggested. There is probably moderate-to-severe chronic small vessel disease. There is mild diffuse prominence of the extra-axial spaces, suggesting mild diffuse cortical atrophy. No acute skull fracture is seen. There is an acute left parietal scalp contusion with laceration. Cutaneous clips are in place. No significant acute findings are seen with regard to the imaged orbits, paranasal sinuses, or middle ear clefts. There is chronic leftward nasal septal deviation. Benign external auditory canal debris is suspected.  Degenerative changes involve the partially imaged cervical spine. There are degenerative changes of the temporomandibular joints (TMJs), greater on the right than the left.       Impression: No significant interval change is seen in the small volume of acute subarachnoid hemorrhage involving the right superior frontal sulcus. No definite new acute intracranial hemorrhage is appreciated. Please see above comments for further detail.   Portions of this note were completed with a voice recognition program.  5/3/2025 2:42 AM by Dimitrios Evans MD on Workstation: [id number]      CT Head Without Contrast  Result Date: 5/2/2025  CT HEAD WO CONTRAST Date of Exam: 5/2/2025 10:32 AM EDT Indication: Fall with head injury. Comparison: None available. Technique: Axial CT images were obtained of the head and cervical spine without contrast administration.  Reconstructed coronal and sagittal images were also obtained. Automated exposure control and iterative construction methods were used. Findings: CT HEAD: There is no evidence of acute infarction. There is a small area of subarachnoid hemorrhage involving a high posterior right frontoparietal lobe sulcus (images 37-40, series 3). No definitive additional hemorrhage identified. There are no extra-axial collections. Ventricles and CSF spaces are symmetric. No mass effect nor hydrocephalus. Brain parenchyma appears normal for age.  Paranasal sinuses and mastoid air cells are adequately aerated.  Osseous structures and orbits appear intact. There is a left parietal scalp laceration. No radiopaque foreign body identified. CT CERVICAL SPINE: OSSEOUS STRUCTURES: No fracture nor bony destructive process. There are large flowing osteophytes along the left anterior margin of the vertebral bodies spanning C5-C7. ALIGNMENT: There is 2 mm of anterior listhesis of C4 on C5 and C5 on C6. Alignment is otherwise normal. DISC SPACE: There is moderate to advanced lower cervical spondylosis most  pronounced at C6/7. JOINTS: There is advanced facet arthropathy most pronounced in the mid cervical spine. SOFT TISSUES: There is calcified carotid atherosclerotic disease. LUNG APICES: Unremarkable LEVELS: No significant osseous central canal nor foraminal stenosis identified at any level.     Impression: Impression: 1.Small area of subarachnoid hemorrhage involving a high posterior right frontoparietal lobe sulcus. 2.Left parietal scalp laceration. No radiopaque foreign body identified. 3.No cervical spine fracture. 4.Cervical degenerative changes as described above. Critical findings discussed by Dr. Sukumar Conway with Ibrahima Rao MD at 10:56 a.m. on 5/2/2025 Electronically Signed: Sukumar Conway MD  5/2/2025 10:58 AM EDT  Workstation ID: [id number]      CT of head follow-up with no significant interval change in the small volume of acute subarachnoid hemorrhage    Assessment & Plan   Assessment / Plan     Active Hospital Problems:  Active Hospital Problems    Diagnosis     **SAH (subarachnoid hemorrhage)      Impression:  Traumatic subarachnoid hemorrhage  Accelerated hypertension  Scalp laceration  Lucency on T1 transverse process, osseous lesion versus metastatic disease  Hypokalemia     Plan:  NIH score 0  Continue Norvasc and uptitrate and wean off Cleviprex drip.  Goal systolic blood pressure less than 140/90  Repeat head CT reassuring  Appreciate neurosurgery input  Advance diet as tolerated  Continue current pain regimen  Trend renal panel and electrolytes.  Replace potassium orally  Appreciate neurosurgery input  Speech/PT/OT to evaluate    VTE Prophylaxis:  Mechanical VTE prophylaxis orders are present.    CODE STATUS:   Code Status (Patient has no pulse and is not breathing): CPR (Attempt to Resuscitate)  Medical Interventions (Patient has pulse or is breathing): Full Support      Patient is critically ill with traumatic subarachnoid hemorrhage, accelerated hypertension, scalp laceration. We have  personally reviewed all pertinent labs, imaging, microbiology and documentation. We have discussed care with the primary service as well as at multidisciplinary critical care rounds with the bedside nurse, respiratory therapist, pharmacist and all other ancillary services. 32 minutes of critical care time was spent managing this patient, excluding procedures. Of this time, I spent 20 minutes in accordance with split shared billing.    I, ANAMARIA Velasquez spent 12 minutes in accordance with split shared billing.  Electronically signed by ANAMARIA Tao, 05/03/25, 2:25 PM EDT.     Electronically signed by Mele Ramos MD, 05/03/25, 3:02 PM EDT.

## 2025-05-03 NOTE — THERAPY EVALUATION
Acute Care - Physical Therapy Initial Evaluation  ERIKA Martínez     Patient Name: Lindsey Choi  : 1934  MRN: [id number]  Today's Date: 5/3/2025      Visit Dx:     ICD-10-CM ICD-9-CM   1. Fall, initial encounter  W19.XXXA E888.9   2. Subarachnoid hematoma, with unknown loss of consciousness status, initial encounter  S06.6XAA 852.00   3. Laceration of scalp, initial encounter  S01.01XA 873.0   4. Dysphagia, unspecified type  R13.10 787.20   5. Difficulty in walking  R26.2 719.7     Patient Active Problem List   Diagnosis    SAH (subarachnoid hemorrhage)     Past Medical History:   Diagnosis Date    Hypertension      History reviewed. No pertinent surgical history.  PT Assessment (Last 12 Hours)       PT Evaluation and Treatment       Row Name 25 1200          Physical Therapy Time and Intention    Subjective Information no complaints  -TC     Document Type evaluation  -TC     Mode of Treatment individual therapy;physical therapy  -TC     Patient Effort good  -TC     Symptoms Noted During/After Treatment none  -TC       Row Name 25 1200          General Information    Patient Profile Reviewed yes  -TC     Patient Observations alert;cooperative  -TC     Prior Level of Function independent:;gait;transfer;bed mobility;ADL's  -TC     Existing Precautions/Restrictions fall  -TC     Barriers to Rehab none identified  -TC       Row Name 25 1200          Living Environment    Current Living Arrangements home  -TC     People in Home alone  -TC     Primary Care Provided by self  -TC       Row Name 25 1200          Home Use of Assistive/Adaptive Equipment    Equipment Currently Used at Home none  -TC       Row Name 25 1200          Pain    Pretreatment Pain Rating 0/10 - no pain  -TC     Posttreatment Pain Rating 0/10 - no pain  -TC       Row Name 25 1200          Cognition    Orientation Status (Cognition) oriented x 3  -TC       Row Name 25 1200          Range of Motion  (ROM)    Range of Motion bilateral lower extremities;ROM is WFL  -       Row Name 05/03/25 1200          Range of Motion Comprehensive    General Range of Motion no range of motion deficits identified  -       Row Name 05/03/25 1200          Strength Comprehensive (MMT)    General Manual Muscle Testing (MMT) Assessment lower extremity strength deficits identified  -     Comment, General Manual Muscle Testing (MMT) Assessment BLE: 4-/5  -       Row Name 05/03/25 1200          Bed Mobility    Bed Mobility supine-sit-supine;scooting/bridging  -     Scooting/Bridging [place] (Bed Mobility) moderate assist (50% patient effort)  -     Supine-Sit-Supine [place] (Bed Mobility) minimum assist (75% patient effort)  -     Bed Mobility, Safety Issues decreased use of arms for pushing/pulling;decreased use of legs for bridging/pushing  -     Assistive Device (Bed Mobility) bed rails;repositioning sheet  -       Row Name 05/03/25 1200          Transfers    Transfers sit-stand transfer;stand-sit transfer;toilet transfer  -       Row Name 05/03/25 1200          Sit-Stand Transfer    Sit-Stand [place] (Transfers) minimum assist (75% patient effort)  -     Assistive Device (Sit-Stand Transfers) walker, front-wheeled  -       Row Name 05/03/25 1200          Stand-Sit Transfer    Stand-Sit [place] (Transfers) minimum assist (75% patient effort)  -     Assistive Device (Stand-Sit Transfers) walker, front-wheeled  -       Row Name 05/03/25 1200          Toilet Transfer    Type (Toilet Transfer) sit-stand;stand-sit  -TC     [place] Level (Toilet Transfer) minimum assist (75% patient effort)  -     Assistive Device (Toilet Transfer) commode, bedside without drop arms;walker, front-wheeled  -TC     Comment, (Toilet Transfer) VC for placement of hands prior to transfers for safety.  -       Row Name 05/03/25 1200          Gait/Stairs (Locomotion)    Gait/Stairs Locomotion  gait/ambulation assistive device  -TC     [place] Level (Gait) minimum assist (75% patient effort)  -TC     Assistive Device (Gait) walker, front-wheeled  -TC     Patient was able to Ambulate yes  -TC     Distance in Feet (Gait) 15  x 2 reps  -TC     Deviations/Abnormal Patterns (Gait) kilo decreased;gait speed decreased  -TC     Bilateral Gait Deviations forward flexed posture  -TC     Gait Assessment/Intervention VC and TC needed for optimal placement of self within walker due to pt preferring to push walker too far forward.  -TC       Row Name 05/03/25 1200          Safety Issues/Impairments Affecting Functional Mobility    Safety Issues Affecting Function (Mobility) awareness of need for assistance  -TC       Row Name 05/03/25 1200          Balance    Balance Assessment sitting static balance;sitting dynamic balance;standing static balance;standing dynamic balance  -TC     Static Sitting Balance contact guard  -TC     Dynamic Sitting Balance minimal assist  -TC     Position, Sitting Balance supported;sitting edge of bed  -TC     Static Standing Balance minimal assist  -TC     Dynamic Standing Balance minimal assist  -TC     Position/Device Used, Standing Balance walker, front-wheeled  -TC       Row Name             Wound 05/02/25 1815 Right anterior wrist Other (Comments)    Wound - Properties Group Placement Date: 05/02/25  -SM Placement Time: 1815 -SM Present on Original Admission: Y  -SM Side: Right  -SM Orientation: anterior  -SM Location: wrist  -SM Primary Wound Type: Other  -SM Secondary Wound Type - Other: --  -SM, SKIN TEAR     Retired Wound - Properties Group Placement Date: 05/02/25  -SM Placement Time: 1815 -SM Present on Original Admission: Y  -SM Side: Right  -SM Orientation: anterior  -SM Location: wrist  -SM    Retired Wound - Properties Group Placement Date: 05/02/25  -SM Placement Time: 1815 -SM Present on Original Admission: Y  -SM Side: Right  -SM Orientation: anterior  -SM  Location: wrist  -SM    Retired Wound - Properties Group Date first assessed: 05/02/25  -SM Time first assessed: 1815  -SM Present on Original Admission: Y  -SM Side: Right  -SM Location: wrist  -SM      Row Name             Wound 05/02/25 1820 Left posterior head Other (Comments)    Wound - Properties Group Placement Date: 05/02/25  -SM Placement Time: 1820  -SM Present on Original Admission: Y  -SM Side: Left  -SM Orientation: posterior  -SM Location: head  -SM Primary Wound Type: Other  -SM Secondary Wound Type - Other: --  -SM, SCALP LACERATION  Additional Comments: STAPLES PRESENT  -SM    Retired Wound - Properties Group Placement Date: 05/02/25  -SM Placement Time: 1820  -SM Present on Original Admission: Y  -SM Side: Left  -SM Orientation: posterior  -SM Location: head  -SM Additional Comments: STAPLES PRESENT  -SM    Retired Wound - Properties Group Placement Date: 05/02/25  -SM Placement Time: 1820 -SM Present on Original Admission: Y  -SM Side: Left  -SM Orientation: posterior  -SM Location: head  -SM Additional Comments: STAPLES PRESENT  -SM    Retired Wound - Properties Group Date first assessed: 05/02/25  -SM Time first assessed: 1820  -SM Present on Original Admission: Y  -SM Side: Left  -SM Location: head  -SM Additional Comments: STAPLES PRESENT  -SM      Row Name 05/03/25 1200          Plan of Care Review    Plan of Care Reviewed With patient  -TC     Outcome Evaluation Pt presents with decreased strength, transfers and functional mobility. Will benefit from inpatient PT services and continued PT services upon discharge.  -TC       Row Name 05/03/25 1200          Positioning and Restraints    Pre-Treatment Position in bed  -TC     Post Treatment Position bed  -TC     In Bed supine;call light within reach;encouraged to call for assist;exit alarm on;with nsg  -TC       Row Name 05/03/25 1200          Therapy Assessment/Plan (PT)    Criteria for Skilled Interventions Met (PT) yes;meets criteria  -TC      Therapy Frequency (PT) daily  -TC     Predicted Duration of Therapy Intervention (PT) 10 days  -TC     Problem List (PT) problems related to;mobility  -TC     Activity Limitations Related to Problem List (PT) unable to transfer safely;unable to ambulate safely  -TC       Row Name 05/03/25 1200          PT Evaluation Complexity    History, PT Evaluation Complexity no personal factors and/or comorbidities  -TC     Examination of Body Systems (PT Eval Complexity) total of 4 or more elements  -TC     Clinical Presentation (PT Evaluation Complexity) stable  -TC     Clinical Decision Making (PT Evaluation Complexity) low complexity  -TC     Overall Complexity (PT Evaluation Complexity) low complexity  -TC       Row Name 05/03/25 1200          Physical Therapy Goals    Transfer Goal Selection (PT) transfer, PT goal 1  -TC     Gait Training Goal Selection (PT) gait training, PT goal 1  -TC       Row Name 05/03/25 1200          Transfer Goal 1 (PT)    Activity/Assistive Device (Transfer Goal 1, PT) sit-to-stand/stand-to-sit  -TC     [place] Level/Cues Needed (Transfer Goal 1, PT) modified independence  -TC     Time Frame (Transfer Goal 1, PT) 10 days  -TC       Row Name 05/03/25 1200          Gait Training Goal 1 (PT)    Activity/Assistive Device (Gait Training Goal 1, PT) assistive device use;walker, rolling  -TC     [place] Level (Gait Training Goal 1, PT) modified independence  -TC     Distance (Gait Training Goal 1, PT) 200  -TC     Time Frame (Gait Training Goal 1, PT) 10 days  -TC               User Key  (r) = Recorded By, (t) = Taken By, (c) = Cosigned By      Initials Name Provider Type    Nicolette Morin, RN Registered Nurse    Maryan Johnson PT Physical Therapist                    Physical Therapy Education       Title: PT OT SLP Therapies (Done)       Topic: Physical Therapy (Done)       Point: Mobility training (Done)       Learning Progress Summary            Patient Acceptance, E, VU  by TC at 5/3/2025 1228                      Point: Home exercise program (Done)       Learning Progress Summary            Patient Acceptance, E, VU by TC at 5/3/2025 1228                      Point: Body mechanics (Done)       Learning Progress Summary            Patient Acceptance, E, VU by TC at 5/3/2025 1228                      Point: Precautions (Done)       Learning Progress Summary            Patient Acceptance, E, VU by TC at 5/3/2025 1228                                      User Key       Initials Effective Dates Name Provider Type Discipline    TC 06/18/24 -  Maryan Gallardo, PT Physical Therapist PT                  PT Recommendation and Plan  Anticipated Discharge Disposition (PT): home with home health, home with assist  Planned Therapy Interventions (PT): balance training, bed mobility training, gait training, strengthening, transfer training  Therapy Frequency (PT): daily  Plan of Care Reviewed With: patient  Outcome Evaluation: Pt presents with decreased strength, transfers and functional mobility. Will benefit from inpatient PT services and continued PT services upon discharge.   Outcome Measures       Row Name 05/03/25 1200             How much help from another person do you currently need...    Turning from your back to your side while in flat bed without using bedrails? 4  -TC      Moving from lying on back to sitting on the side of a flat bed without bedrails? 3  -TC      Moving to and from a bed to a chair (including a wheelchair)? 3  -TC      Standing up from a chair using your arms (e.g., wheelchair, bedside chair)? 3  -TC      Climbing 3-5 steps with a railing? 3  -TC      To walk in hospital room? 3  -TC      AM-PAC 6 Clicks Score (PT) 19  -TC         Functional Assessment    Outcome Measure Options AM-PAC 6 Clicks Basic Mobility (PT)  -TC                User Key  (r) = Recorded By, (t) = Taken By, (c) = Cosigned By      Initials Name Provider Type    TC Maryan Gallardo, PT Physical  Therapist                     Time Calculation:    PT Charges       Row Name 05/03/25 1229             Time Calculation    PT Received On 05/03/25  -TC      PT Goal Re-Cert Due Date 05/12/25  -TC         Untimed Charges    PT Eval/Re-eval Minutes 50  -TC         Total Minutes    Untimed Charges Total Minutes 50  -TC       Total Minutes 50  -TC                User Key  (r) = Recorded By, (t) = Taken By, (c) = Cosigned By      Initials Name Provider Type    TC Maryan Gallardo, PT Physical Therapist                  Therapy Charges for Today       Code Description Service Date Service Provider Modifiers Qty    [id number] HC PT EVAL LOW COMPLEXITY 3 5/3/2025 Maryan Gallardo, PT GP 1            PT G-Codes  Outcome Measure Options: AM-PAC 6 Clicks Basic Mobility (PT)  AM-PAC 6 Clicks Score (PT): 19    Maryan Gallardo PT  5/3/2025

## 2025-05-03 NOTE — DISCHARGE SUMMARY
[facility]         HOSPITALIST  DISCHARGE SUMMARY    Patient Name: Lindsey Choi  : 1934  MRN: [id number]    Date of Admission: 2025  Date of Discharge:  5/3/2025  Primary Care Physician: Provider, No Known    Consults       Date and Time Order Name Status Description    2025  5:21 PM Inpatient Pulmonology Consult Completed     2025  2:53 PM Inpatient Neurosurgery Consult Completed     2025  2:06 PM Inpatient Hospitalist Consult      2025 11:13 AM Neurosurgery (on-call MD unless specified)              Active and Resolved Hospital Problems:  Subarachnoid hemorrhage  Scalp laceration  Lucency on left T1 transverse process osseous lesion versus metastatic disease, further workup may be needed as an outpatient    Hospital Course     Hospital Course:  Lindsey Choi is a 89 y.o. female past medical history of hypertension who presents emergency department after a fall     Patient reported that she had a fall from standing today.  Notes that she had a cut on her head.  As result scan the ER for further evaluation.    In the emergency department patient's vital signs are as follows: Temperature 97.8, pulse 68, respirate is 18, blood pressure 1 3467, 96% on room air.  CBC shows a white count of 12.51.  CMP shows a creatinine 1.20 and a glucose of 145.  CT of the head shows small area of subarachnoid hemorrhage involving high posterior right frontal parietal lobe sulcus.  Left parietal scalp laceration.  No cervical spine fracture.  CT of the cervical spine showed lucency within the left T1 transverse process with mild osseous expansion with underlying osseous lesion and/or metastatic disease cannot be excluded?.  Neurosurgery was consulted.  Repeat CT scan was reviewed, subarachnoid hemorrhage was stable, patient adamant she wants to go home.  Patient weaned off Cleviprex drip, her home Norvasc was increased her blood pressure was appropriate.  Patient is  discharged home today in stable condition to follow-up with her PCP, patient will need staple removal from her scalp laceration as an outpatient  Patient will follow-up with neurosurgery in 4 weeks  Further workup for T1 transverse process osseous lesion versus metastatic disease can be pursued on an outpatient basis.  Patient is discharged home today in stable condition    Day of Discharge     Vital Signs:  Temp:  [97.7 °F (36.5 °C)-98.2 °F (36.8 °C)] 98.1 °F (36.7 °C)  Heart Rate:  [] 79  Resp:  [12-21] 14  BP: ()/(48-90) 144/69    Physical Exam:   GEN: No acute distress  HEENT: Moist mucous membranes  LUNGS: Equal chest rise bilaterally  CARDIAC: Regular rate and rhythm  NEURO: Moving all 4 extremities spontaneously  SKIN: Scalp laceration noted    Discharge Details        Discharge Medications        Changes to Medications        Instructions Start Date   amLODIPine 10 MG tablet  Commonly known as: NORVASC  What changed:   medication strength  how much to take   10 mg, Oral, Daily   Start Date: May 4, 2025            Continue These Medications        Instructions Start Date   vitamin D 1.25 MG (70407 UT) capsule capsule  Commonly known as: ERGOCALCIFEROL   50,000 Units, 2 Times Weekly             Stop These Medications      aspirin 81 MG EC tablet              Allergies   Allergen Reactions    Penicillins Hives       Discharge Disposition:  Home or Self Care    Diet:  Hospital:  Diet Order   Procedures    Diet: Regular/House, Cardiac; Healthy Heart (2-3 Na+); Texture: Regular (IDDSI 7); Fluid Consistency: Thin (IDDSI 0)       Discharge Activity:       CODE STATUS:  Code Status and Medical Interventions: CPR (Attempt to Resuscitate); Full Support   Ordered at: 05/03/25 0750     Code Status (Patient has no pulse and is not breathing):    CPR (Attempt to Resuscitate)     Medical Interventions (Patient has pulse or is breathing):    Full Support       No future appointments.    Additional Instructions  for the Follow-ups that You Need to Schedule       Discharge Follow-up with PCP   As directed       Currently Documented PCP:    Provider, No Known    PCP Phone Number:    None     Follow Up Details: 3 to 7 days        Discharge Follow-up with Specified Provider: neurosurgery; 1 Month   As directed      To: neurosurgery   Follow Up: 1 Month                Pertinent  and/or Most Recent Results     IMAGING:  CT Head Without Contrast  Result Date: 5/3/2025  CT HEAD WO CONTRAST-  Date of exam: 5/3/2025 2:29 AM.  Comparison: 5/2/2025.  INDICATIONS: 89-year-old female with history of acute SAH (subarachnoid hemorrhage); w19.xxxa-unspecified fall, initial encounter; s06.6xaa-traumatic subarachnoid hemorrhage with loss of consciousness status unknown, initial encounter; s01.01xa-laceration without foreign body of scalp, initial encounter.  TECHNIQUE: Axial CT images were obtained of the head without contrast administration. Reconstructed 2D coronal and sagittal images were also obtained. Automated exposure control and iterative construction methods were used. Additionally, the radiation dose reduction device was turned on for each scan per the ALARA (As Low as Reasonably Achievable) protocol. Please see the electronic medical record, EMR (i.e., Epic), for the documented radiation dose.  FINDINGS: A routine nonenhanced head CT was performed. Again acute subarachnoid hemorrhage is seen in the right superior frontal sulcus, as on image 44 of series 2, image 42 of series 5, image 38 of series 4, and adjacent images. Minimal, if any, acute intracranial hemorrhage is seen elsewhere. No definite new acute intracranial hemorrhage is identified. No midline shift or acute intracranial herniation syndrome. No acute infarct is suggested. There is probably moderate-to-severe chronic small vessel disease. There is mild diffuse prominence of the extra-axial spaces, suggesting mild diffuse cortical atrophy. No acute skull fracture is seen.  There is an acute left parietal scalp contusion with laceration. Cutaneous clips are in place. No significant acute findings are seen with regard to the imaged orbits, paranasal sinuses, or middle ear clefts. There is chronic leftward nasal septal deviation. Benign external auditory canal debris is suspected. Degenerative changes involve the partially imaged cervical spine. There are degenerative changes of the temporomandibular joints (TMJs), greater on the right than the left.       No significant interval change is seen in the small volume of acute subarachnoid hemorrhage involving the right superior frontal sulcus. No definite new acute intracranial hemorrhage is appreciated. Please see above comments for further detail.   Portions of this note were completed with a voice recognition program.  5/3/2025 2:42 AM by Dimitrios Evans MD on Workstation: [email or web address]      XR Hips Bilateral With or Without Pelvis 2 View  Result Date: 5/2/2025  XR HIPS BILATERAL W OR WO PELVIS 2 VIEW Date of Exam: 5/2/2025 11:20 AM EDT Indication: fall, pain Comparison: None available. Findings: Subtle findings are limited by diffuse osteopenia. The bony pelvis appears to be grossly intact without evidence of fracture or diastases. Evaluation of the sacrum and coccyx additionally limited by bowel gas and fecal material. Degenerative changes are noted at the lumbar sacral spine most prominently to the right at L4-L5. Bilateral hips demonstrate no displaced fracture or dislocation.     Impression: No displaced fracture or dislocation within the limitations of the study. If there is high clinical suspicion and additional imaging can always be obtained Electronically Signed: Oliver To MD  5/2/2025 11:45 AM EDT  Workstation ID: [id number]    CT Cervical Spine Without Contrast  Result Date: 5/2/2025  CT CERVICAL SPINE WO CONTRAST Date of Exam: 5/2/2025 10:32 AM EDT Indication: Fall with head injury. Comparison: None available. Technique: Axial  CT images were obtained of the cervical spine without contrast administration.  Reconstructed coronal and sagittal images were also obtained. Automated exposure control and iterative construction methods were used. Findings: OSSEOUS STRUCTURES: No fracture noted. Along the left transverse process of the T1 vertebral body there is lucency and mild osseous expansion with underlying osseous lesion not excluded. ALIGNMENT: There is grossly normal alignment of cervical vertebral bodies. DISC SPACE: Intervertebral disc base narrowing is noted at levels C5-C7. JOINTS: There is multilevel facet hypertrophy. Degenerative changes are noted at the right temporomandibular joint with flattening of the mandibular condyle. SOFT TISSUES: There is a right thyroid nodule measuring 7 mm without follow-up necessary. LUNG APICES: Unremarkable LEVELS: C2/3: Broad-based disc osteophyte complex with uncovertebral joint spurring and facet changes without significant central canal or neural foramina stenosis. C3/4: Broad-based disc osteophyte complex with facet hypertrophy with uncovertebral joint spurring with mild to moderate bilateral neural foramina stenosis. C4/5: Broad-based disc osteophyte complex with bilateral facet hypertrophy. There is moderate to severe bilateral neural foramina stenosis. C5/6: Broad-based disc osteophyte complex with bilateral facet hypertrophy with severe left neural foramina stenosis. C6/7: Broad-based disc osteophyte complex with facet changes and uncovertebral joint spurring with moderate left neural foramina stenosis.     Impression: 1. Multilevel degenerative changes in the cervical spine without acute osseous injury. 2. Lucency within the left T1 transverse process with mild osseous expansion for which underlying osseous lesion and/or metastatic disease cannot be excluded. Electronically Signed: Shelby Conway MD  5/2/2025 11:03 AM EDT  Workstation ID: [id number]    CT Head Without Contrast  Result Date:  5/2/2025  CT HEAD WO CONTRAST Date of Exam: 5/2/2025 10:32 AM EDT Indication: Fall with head injury. Comparison: None available. Technique: Axial CT images were obtained of the head and cervical spine without contrast administration.  Reconstructed coronal and sagittal images were also obtained. Automated exposure control and iterative construction methods were used. Findings: CT HEAD: There is no evidence of acute infarction. There is a small area of subarachnoid hemorrhage involving a high posterior right frontoparietal lobe sulcus (images 37-40, series 3). No definitive additional hemorrhage identified. There are no extra-axial collections. Ventricles and CSF spaces are symmetric. No mass effect nor hydrocephalus. Brain parenchyma appears normal for age.  Paranasal sinuses and mastoid air cells are adequately aerated.  Osseous structures and orbits appear intact. There is a left parietal scalp laceration. No radiopaque foreign body identified. CT CERVICAL SPINE: OSSEOUS STRUCTURES: No fracture nor bony destructive process. There are large flowing osteophytes along the left anterior margin of the vertebral bodies spanning C5-C7. ALIGNMENT: There is 2 mm of anterior listhesis of C4 on C5 and C5 on C6. Alignment is otherwise normal. DISC SPACE: There is moderate to advanced lower cervical spondylosis most pronounced at C6/7. JOINTS: There is advanced facet arthropathy most pronounced in the mid cervical spine. SOFT TISSUES: There is calcified carotid atherosclerotic disease. LUNG APICES: Unremarkable LEVELS: No significant osseous central canal nor foraminal stenosis identified at any level.     Impression: 1.Small area of subarachnoid hemorrhage involving a high posterior right frontoparietal lobe sulcus. 2.Left parietal scalp laceration. No radiopaque foreign body identified. 3.No cervical spine fracture. 4.Cervical degenerative changes as described above. Critical findings discussed by Dr. Sukumar Conway with  Ibrahima Rao MD at 10:56 a.m. on 5/2/2025 Electronically Signed: Sukumar Conway MD  5/2/2025 10:58 AM EDT  Workstation ID: [id number]      LAB RESULTS:      Lab 05/03/25  0218 05/02/25  1140   WBC 11.84* 12.51*   HEMOGLOBIN 14.4 13.9   HEMATOCRIT 44.8 42.7   PLATELETS 293 284   NEUTROS ABS  --  11.03*   IMMATURE GRANS (ABS)  --  0.08*   LYMPHS ABS  --  0.74   MONOS ABS  --  0.58   EOS ABS  --  0.04   MCV 92.0 92.6         Lab 05/03/25  0218 05/02/25  1140   SODIUM 139 138   POTASSIUM 3.8 3.9   CHLORIDE 103 103   CO2 21.6* 24.8   ANION GAP 14.4 10.2   BUN 17 23   CREATININE 0.93 1.20*   EGFR 58.5* 43.1*   GLUCOSE 128* 145*   CALCIUM 8.9 9.1   MAGNESIUM 2.2  --    PHOSPHORUS 2.7  --    HEMOGLOBIN A1C 5.60  --          Lab 05/03/25  0218 05/02/25  1140   TOTAL PROTEIN 6.5 6.6   ALBUMIN 3.8 3.8   GLOBULIN 2.7 2.8   ALT (SGPT) 10 10   AST (SGOT) 14 15   BILIRUBIN 0.4 0.3   ALK PHOS 62 57             Lab 05/03/25  0218   CHOLESTEROL 196   LDL CHOL 125*   HDL CHOL 48   TRIGLYCERIDES 128             Brief Urine Lab Results       None          Microbiology Results (last 10 days)       ** No results found for the last 240 hours. **                  Time spent on Discharge including face to face service: Greater than 30 minutes      Electronically signed by Darrell Mcclelland MD, 05/03/25, 12:46 PM EDT.

## 2025-05-03 NOTE — THERAPY EVALUATION
"Acute Care - Speech Language Pathology   Swallow Initial Evaluation  [facility]     Patient Name: Lindsey Choi  : 1934  MRN: [id number]  Today's Date: 5/3/2025               Admit Date: 2025    Visit Dx:     ICD-10-CM ICD-9-CM   1. Fall, initial encounter  W19.XXXA E888.9   2. Subarachnoid hematoma, with unknown loss of consciousness status, initial encounter  S06.6XAA 852.00   3. Laceration of scalp, initial encounter  S01.01XA 873.0   4. Dysphagia, unspecified type  R13.10 787.20     Patient Active Problem List   Diagnosis    SAH (subarachnoid hemorrhage)     Past Medical History:   Diagnosis Date    Hypertension      History reviewed. No pertinent surgical history.    SLP Recommendation and Plan          Inpatient Speech Pathology Dysphagia Evaluation        PAIN SCALE: Patient did not indicate that she was having any pain at time of evaluation.    PRECAUTIONS/CONTRAINDICATIONS: Fall precautions.  Standard precautions.    SUSPECTED ABUSE/NEGLECT/EXPLOITATION: None observed or reported.    SOCIAL/PSYCHOLOGICAL NEEDS/BARRIERS: N/A    PAST SOCIAL HISTORY: Independent at home.    PRIOR FUNCTION: Within functional limits per patient.    PATIENT GOALS/EXPECTATIONS:  To consume least restrictive diet without signs/symptoms of aspiration. To identify type and severity of current deficits and provide appropriate treatment.     HISTORY: Lindsey Choi is a 89 y.o. female with a history of hypertension who presented to the ED due to tripping on a heating grate in her home.  CT head reported, \"1.Small area of subarachnoid hemorrhage involving a high posterior right frontoparietal lobe sulcus. 2.Left parietal scalp laceration.\"    CURRENT DIET LEVEL: Level 0 thin liquids and level 7 regular solids.    OBJECTIVE:    TEST ADMINISTERED: Oral mechanisms exam and clinical swallow evaluation.    COGNITION/SAFETY AWARENESS: May be impaired.  Not formally assessed.    BEHAVIORAL OBSERVATIONS: Patient was " pleasant and cooperative.  Patient does present with a presents for speech; however, she may be talkative at her baseline.  Patient did well answering SLP questions and did not demonstrate any instances of anomia.  Patient demonstrated 1 paraphasia during evaluation but quickly corrected.    ORAL MOTOR EXAM: Patient had good range of motion with oral mechanisms and did not appear to have any significant weakness.  No one-sided weakness noted.    VOICE QUALITY: Normal.    REFLEX EXAM: Velum cannot be well-preserved.    POSTURE: Upright for oral care when eating or drinking.    FEEDING/SWALLOWING FUNCTION:   Oral care provided prior to trials. Patient participated in clinical swallow evaluation and trialed level 0 thin liquids via cup and straw, level 4 pureed solids, level 6 soft and bite sized solids, and level 7 regular solids.  Patient did not demonstrate any overt signs/symptoms of aspiration with any of the consistencies trialed.     CLINICAL OBSERVATIONS: Patient had mildly delayed initiation of swallow likely age related weakness.  Patient appeared to have good hyolaryngeal excursion.  Patient denied any difficulty with her swallow or globus sensation.  Patient does not require follow-up speech services to address dysphagia at this time.  Patient may be reevaluated if she has a change in condition.    DYSPHAGIA CRITERIA: CVA.    FUNCTIONAL ASSESSMENT INSTRUMENT: Patient currently scored a level 7 of 7 on Functional Communication Measures for swallowing indicating a 0% limitation in function.    ASSESSMENT/ PLAN OF CARE:  Patient does not require follow-up speech services to address dysphagia at this time.  Patient may benefit from more formal assessment of cognition and language to rule out possible impairments.  However, this may be patient's baseline level of functioning.    RECOMMENDATIONS:   1.   DIET: Level 0 thin liquids and level 7 regular solids.    2.  POSITION: Upright for oral care when eating or  drinking.    3.  COMPENSATORY STRATEGIES: Eat at a slow rate and take small bites and sips.  Complete oral care frequently.    Pt/responsible party agrees with plan of care and has been informed of all alternatives, risks and benefits.                                                                                    EDUCATION  The patient has been educated in the following areas:   Dysphagia (Swallowing Impairment).                Time Calculation:    Time Calculation- SLP       Row Name 05/03/25 0915             Time Calculation- SLP    SLP Start Time 0915  -AW      SLP Stop Time 0945  -AW      SLP Time Calculation (min) 30 min  -AW      SLP Received On 05/03/25  -AW         Untimed Charges    24030-DN Eval Oral Pharyng Swallow Minutes 30  -AW         Total Minutes    Untimed Charges Total Minutes 30  -AW       Total Minutes 30  -AW                User Key  (r) = Recorded By, (t) = Taken By, (c) = Cosigned By      Initials Name Provider Type    Cecelia Fisher SLP Speech and Language Pathologist                    Therapy Charges for Today       Code Description Service Date Service Provider Modifiers Qty    [id number] HC ST EVAL ORAL PHARYNG SWALLOW 2 5/3/2025 Cecelia Crespo SLP GN 1                 JEANNE Bernal  5/3/2025

## 2025-05-04 NOTE — OUTREACH NOTE
Prep Survey      Flowsheet Row Responses   [facility] facility patient discharged from? [facility]   Is LACE score < 7 ? Yes   Eligibility Readm Mgmt   Discharge diagnosis SAH (subarachnoid hemorrhage)   Does the patient have one of the following disease processes/diagnoses(primary or secondary)? Other   Prep survey completed? Yes            Tabatha MAJOR - Registered Nurse

## 2025-06-03 ENCOUNTER — OFFICE VISIT (OUTPATIENT)
Dept: NEUROSURGERY | Facility: CLINIC | Age: OVER 89
End: 2025-06-03
Payer: MEDICARE

## 2025-06-03 VITALS
HEIGHT: 63 IN | WEIGHT: 163.1 LBS | SYSTOLIC BLOOD PRESSURE: 101 MMHG | BODY MASS INDEX: 28.9 KG/M2 | DIASTOLIC BLOOD PRESSURE: 52 MMHG

## 2025-06-03 DIAGNOSIS — I60.9 SAH (SUBARACHNOID HEMORRHAGE): Primary | ICD-10-CM

## 2025-06-03 NOTE — PROGRESS NOTES
Lindsey Choi is a 89 y.o. female that presents with subarachnoid hemorrhage       HPI    History of Present Illness  The patient is a 89-year-old female who presents for evaluation of a head injury.    She reports no current headaches, although she experienced morning headaches during the initial two weeks following her head injury. There is no new onset of weakness or numbness. She expresses frustration over her decreased mobility and increased caution in her movements. Additionally, she recalls an incident where she suddenly experienced foot numbness upon rising one morning. She has a history of circulatory issues, particularly swelling in her left leg.    Social History:    Education Level: Took classes until age 83       Vitals:    06/03/25 1355   BP: 101/52        Physical Exam  Neurological:      Mental Status: She is alert and oriented to person, place, and time.      Motor: No weakness.   Psychiatric:         Mood and Affect: Mood normal.          Results  Imaging   - Head CT: Small area of bleeding on the right side        Assessment and Plan {CC Problem List  Visit Diagnosis  ROS  Review (Popup)  Health Maintenance  Quality  BestPractice  Medications  SmartSets  SnapShot Encounters  Media :23}   Problem List Items Addressed This Visit       SAH (subarachnoid hemorrhage) - Primary       Assessment & Plan  1. Head injury.  Significant scalp bleeding was managed with staples. Internal bleeding was minimal and located on the right side. Due to the patient's advanced age, the brain has slightly more space within the skull, which can lead to a contracoup injury. Currently, there are no severe headaches or weakness present, so a repeat CT scan is not necessary at this time. The patient is advised to maintain caution in her activities without becoming overly sedentary. If new symptoms such as headaches or weakness develop in the future, a head CT scan will be considered.    2. Numbness in  foot.  The patient reports persistent numbness in her foot, which is not related to the head injury as it is on the opposite side. This may be due to circulation problems, as she has a history of swelling in the left leg. A thorough examination was conducted, including motor function tests, which the patient passed. Further evaluation may be necessary if the numbness persists or worsens.     3. General health and activity.  The patient is encouraged to remain active while being cautious to prevent falls. She should avoid overly strenuous activities that could lead to injury. Regular physical activity is recommended to maintain overall health and mobility.    OK to resume her aspirin.       Follow Up {Instructions Charge Capture  Follow-up Communications :23}   Return if symptoms worsen or fail to improve.      Patient or patient representative verbalized consent for the use of Ambient Listening during the visit with  Prasanth López MD for chart documentation. 6/3/2025  14:06 EDT